# Patient Record
Sex: MALE | Race: WHITE | NOT HISPANIC OR LATINO | Employment: OTHER | URBAN - METROPOLITAN AREA
[De-identification: names, ages, dates, MRNs, and addresses within clinical notes are randomized per-mention and may not be internally consistent; named-entity substitution may affect disease eponyms.]

---

## 2018-01-23 PROBLEM — S46.219A BICEPS TENDON TEAR: Status: ACTIVE | Noted: 2018-01-23

## 2018-05-15 PROBLEM — IMO0002 BURSITIS/TENDONITIS, SHOULDER: Status: ACTIVE | Noted: 2018-05-15

## 2019-05-23 PROBLEM — R57.1 HYPOVOLEMIC SHOCK: Status: ACTIVE | Noted: 2019-05-23

## 2021-03-28 PROBLEM — R41.82 ALTERED MENTAL STATUS: Status: ACTIVE | Noted: 2021-03-28

## 2021-03-30 PROBLEM — R41.82 ALTERED MENTAL STATUS: Status: RESOLVED | Noted: 2021-03-28 | Resolved: 2021-03-30

## 2023-03-23 PROBLEM — G89.4 CHRONIC PAIN DISORDER: Status: ACTIVE | Noted: 2023-03-23

## 2023-03-23 PROBLEM — M62.3 IMMOBILITY SYNDROME: Status: ACTIVE | Noted: 2022-11-22

## 2023-03-23 PROBLEM — I50.22 CHRONIC SYSTOLIC CHF (CONGESTIVE HEART FAILURE): Status: ACTIVE | Noted: 2017-06-24

## 2023-03-27 ENCOUNTER — TELEPHONE (OUTPATIENT)
Dept: INTERNAL MEDICINE | Age: 68
End: 2023-03-27

## 2023-03-27 NOTE — TELEPHONE ENCOUNTER
Caller: William Shah Jr    Relationship: Self    Best call back number: [phone]    What orders are you requesting (i.e. lab or imaging): PATIENT NEEDS A NEW POWERCHAIR    Where will you receive your lab/imaging services: PLEASE SEND TO [facility]  PHONE #[phone]    Additional notes: PLEASE ADVISE WHEN SENT OR WITH FURTHER QUESTIONS

## 2023-03-31 ENCOUNTER — TELEPHONE (OUTPATIENT)
Dept: INTERNAL MEDICINE | Age: 68
End: 2023-03-31

## 2023-03-31 NOTE — TELEPHONE ENCOUNTER
Pt was informed that Lisa was out of the hospital   He would like his Lorazepam to be increase to 4 daily   Trazdone to 200 mg nightly.

## 2023-03-31 NOTE — TELEPHONE ENCOUNTER
Caller: William Shah Jr call back number:[phone]       PATIENT WOULD LIKE A CALL BACK FROM SERGO HAWKINS REGARDING HIS MEDICATIONS.  HE STATED THAT SHE HAS HELPED HIM A LOT.  PLEASE CALL BACK AS SOON AS POSSIBLE.

## 2023-05-01 ENCOUNTER — TELEPHONE (OUTPATIENT)
Dept: INTERNAL MEDICINE | Age: 68
End: 2023-05-01

## 2023-05-01 NOTE — TELEPHONE ENCOUNTER
Caller: William Shah Jr.    Relationship: Self    Best call back number: [phone]    Who are you requesting to speak with (clinical staff, provider,  specific staff member): SERGO HAWKINS     What was the call regarding: WANTS TO SPEAK TO SERGO ABOUT LAB RESULTS     Do you require a callback: YES

## 2023-05-01 NOTE — TELEPHONE ENCOUNTER
"Message left on [facility] on 04/30/23 @ 1601:    \"I have an appointment tomorrow with Dr. Faustin and I mistakenly hit the wrong button, and I still want the appointment at 1:15. So, please keep me on the schedule.\"    Rescheduled appt at same time with Lisa per patient request.  "

## 2023-05-08 ENCOUNTER — TELEPHONE (OUTPATIENT)
Dept: INTERNAL MEDICINE | Age: 68
End: 2023-05-08
Payer: MEDICARE

## 2023-05-08 NOTE — TELEPHONE ENCOUNTER
"Messages left on [facility]ie on 5/5/23 at 2035, and 5/6/23 at 0952, respectively:    \"To reschedule an appointment\"    \"Reschedule\"    Please call patient to get him scheduled.  "

## 2023-05-10 RX ORDER — SERTRALINE HYDROCHLORIDE 100 MG/1
TABLET, FILM COATED ORAL
Qty: 60 TABLET | Refills: 3 | Status: SHIPPED | OUTPATIENT
Start: 2023-05-10

## 2023-05-11 ENCOUNTER — OFFICE VISIT (OUTPATIENT)
Dept: INTERNAL MEDICINE | Age: 68
End: 2023-05-11
Payer: MEDICARE

## 2023-05-11 VITALS
HEIGHT: 60 IN | SYSTOLIC BLOOD PRESSURE: 124 MMHG | HEART RATE: 78 BPM | TEMPERATURE: 98.4 F | BODY MASS INDEX: 39.07 KG/M2 | WEIGHT: 199 LBS | OXYGEN SATURATION: 94 % | DIASTOLIC BLOOD PRESSURE: 66 MMHG

## 2023-05-11 DIAGNOSIS — F10.10 ALCOHOL ABUSE: Primary | ICD-10-CM

## 2023-05-11 DIAGNOSIS — F11.20 NARCOTIC ADDICTION: ICD-10-CM

## 2023-05-11 DIAGNOSIS — F41.9 ANXIETY: ICD-10-CM

## 2023-05-11 DIAGNOSIS — Z99.3 DEPENDENT ON WHEELCHAIR: ICD-10-CM

## 2023-05-11 RX ORDER — GABAPENTIN 400 MG/1
1 CAPSULE ORAL 3 TIMES DAILY
COMMUNITY
Start: 2023-04-28

## 2023-05-11 RX ORDER — LANOLIN ALCOHOL/MO/W.PET/CERES
100 CREAM (GRAM) TOPICAL DAILY
Qty: 30 TABLET | Refills: 2 | COMMUNITY
Start: 2023-04-14 | End: 2023-07-13

## 2023-05-11 RX ORDER — LORAZEPAM 1 MG/1
TABLET ORAL
COMMUNITY
Start: 2023-03-16

## 2023-05-11 RX ORDER — DIAZEPAM 5 MG/1
TABLET ORAL
COMMUNITY
Start: 2023-05-06

## 2023-05-11 RX ORDER — LISINOPRIL 20 MG/1
TABLET ORAL
COMMUNITY
Start: 2023-02-23

## 2023-05-11 NOTE — PROGRESS NOTES
"    I N T E R N A L  M E D I C I N E    Lisa Faustin, APRN       ENCOUNTER DATE:  05/11/2023    William Shah . / 67 y.o. / male      CC:   (Transitional Care Follow Up Visit)  Follow-up (Pt needs MRI results ) and Med Refill (Ativan )        Within 48 business hours after discharge our office contacted him via telephone to coordinate his care and needs.      I reviewed and discussed the details of that call along with the discharge summary, hospital problems, inpatient lab results, inpatient diagnostic studies, and consultation reports with the patient.          View : No data to display.                Risk for Readmission (LACE) No data recorded          VITALS    Vitals:    05/11/23 1302   BP: 124/66   BP Location: Left arm   Patient Position: Sitting   Cuff Size: Large Adult   Pulse: 78   Temp: 98.4 °F (36.9 °C)   TempSrc: Temporal   SpO2: 94%   Weight: 90.3 kg (199 lb)   Height: 152.4 cm (60\")       BP Readings from Last 3 Encounters:   05/11/23 124/66   03/23/23 152/88   03/22/23 (!) 191/108     Wt Readings from Last 3 Encounters:   05/11/23 90.3 kg (199 lb)   03/23/23 90.3 kg (199 lb)   03/21/23 88 kg (194 lb)      Body mass index is 38.86 kg/m².    HPI:      Date of admission/discharge: 5/4/23  Hospital:[facility]   Principle Dx: Alcoholic intoxication with complication  Secondary Dx: Tremors of nervous system  History prior to hospitalization: Patient admitted multiple times to various Hospitals in [place] on  4/10/23, 4/19/23, 4/28/23, 5/4/23 for Alcohol WIthdrawl and Intoxication and refused to go to Alcoholic Rehab or have Psychiatry evaluate per EMR records.    Course:   First visit with this NP on 3/23/23: Patient was given Lorazepam 0.5mg 1 tablet every 8 hrs as needed #90 as a bilateral amputee and anxiety- medication was escribed to his pharmacy of [facility] in [place], [place]. Our office  received a call on 4/30/23 from Mary, patients caregiver, stating that the patient hadn't had Ativan in 4-5 days " and that patient was going through withdrawals, requesting a refill. Our office called [facility] to verify that patient received the whole 90 tablets and was told by the pharmacist that he picked the whole prescription up, 90 tablets, on 3/23/23.     Patient here today requesting refill on his Lorazepam and informed that I will not fill it again due to Narcotic abuse and that he can be referred to Psychiatry for the refill of his Lorazepam. Narcotic consent on file and reviewed written policies with patient.  Patient became belligerent and left the office calling staff obscenities.        Patient Care Team:  Lisa Faustin APRN as PCP - General (Family Medicine)  Self, Conrado Burr MD (Vascular Surgery)  ____________________________________________________________________    ASSESSMENT & PLAN:    1. Alcohol abuse    2. Narcotic addiction    3. Anxiety    4. Dependent on wheelchair      Orders Placed This Encounter   Procedures   • Miscellaneous DME       ____________________________________________________________________    REVIEW OF SYSTEMS        PHYSICAL EXAMINATION    Not performed- patient left the office prior to assessments     REVIEWED DATA:    Labs:   Lab Results   Component Value Date     (L) 03/21/2023    K 4.1 04/30/2023    CALCIUM 9.7 03/21/2023    AST 21 03/21/2023    ALT 18 03/21/2023    BUN 5 (L) 03/21/2023    CREATININE 0.73 (L) 03/21/2023    CREATININE 1.27 03/30/2021    CREATININE 2.74 (H) 03/29/2021    EGFRIFNONA 57 (L) 03/30/2021    EGFRIFAFRI  03/28/2021      Comment:      <15 Indicative of kidney failure.       Lab Results   Component Value Date    WBC 6.16 05/05/2023    HGB 11.9 (L) 05/05/2023    HGB 12.6 (L) 05/04/2023    HGB 12.2 (L) 04/30/2023     05/05/2023       Lab Results   Component Value Date    GLUCOSEU Negative 03/21/2023    BLOODU Negative 03/21/2023    NITRITEU Negative 03/21/2023    LEUKOCYTESUR Negative 03/21/2023       Imaging:   CT Head Without  Contrast    Result Date: 4/27/2023  Narrative: CT Head WO ACCESSION NUMBER: [id number] DATE: 4/27/2023 9:51 PM PROVIDED INDICATION: Trauma: clsoed head injury, intoxicated COMPARISON: 12/26/2012 STUDY QUALITY: Adequate TECHNIQUE: Axial CT images of the head without IV contrast. All CT exams at this location are performed using dose modulation techniques as appropriate to a performed exam including the following: automated exposure control; adjustment of the mA and/or kV according to patient size (this includes techniques or standardized protocols for targeted exams where dose is matched to indication/reason for exam; i.e. extremities or head); use of iterative reconstruction techniques. Patient motion artifact reduces the study sensitivity. Repeat high-resolution imaging provides excellent quality. FINDINGS: Parenchyma: No acute intracranial hemorrhage, mass effect, midline shift, intra or extra-axial fluid collection present. Gray-white differentiation appears maintained. Ventricles: The ventricles and cisterns appear prominent with some white matter changes. Likely related to sequela of small vessel ischemic disease. Soft tissues: Negative Bones: No acute calvarial fracture seen. Visualized paranasal sinuses: Negative Mastoid air cells: Bilateral mastoid effusions of unknown etiology and significance. IMPRESSION: 1. No definite acute hemorrhage or calvarial fracture event identified. 2. Mild atrophy and white matter changes likely related to small vessel ischemic disease. 2. Bilateral mastoid effusions. Dictated by: Mario Maldonado DO Signed by Mario Maldonado DO on 4/27/2023 10:41 PM ##### Final ##### Dictated by:    MARIO MALDONADO DO-RAD Dictated DT/TM: 04/27/2023 10:41 pm Interpreted and electronically signed by:  MARIO MALDONADO DO-RAD Signed DT/TM:  04/27/2023 10:41 pm    CT Abdomen & Pelvis Wo IV Contrast Without Oral Contrast    Result Date: 5/4/2023  Narrative: REVIEWING YOUR TEST RESULTS IN [facility] IS  NOT A SUBSTITUTE FOR DISCUSSING THOSE RESULTS WITH YOUR HEALTH CARE PROVIDER. PLEASE CONTACT YOUR PROVIDER VIA [employer] TO DISCUSS ANY QUESTIONS OR CONCERNS YOU MAY HAVE REGARDING THESE TEST RESULTS.  RADIOLOGY REPORT FACILITY:  [facility] UNIT/AGE/GENDER: RACH  ER      AGE:67 Y          SEX:M PATIENT NAME/:  MATEUSZ GARZA      1955 UNIT NUMBER:  [id number] ACCOUNT NUMBER:  [id number] ACCESSION NUMBER:  [id number] EXAMINATION: CT ABDOMEN AND PELVIS WO IV CONTRAST DATE: 2023 COMPARISON: April 10, 2023 HISTORY: Abdominal pain, acute, nonlocalized TECHNIQUE:  Multiple axial 3 mm images obtained of the abdomen and pelvis without contrast. Imaging was performed from the lung bases to the pubic symphysis. CT scans at this facility use dose modulation, iterative reconstruction and/or weight based dosing when appropriate to reduce radiation dose to as low as reasonably achievable. FINDINGS: There is mild bibasilar atelectasis. No free intraperitoneal air. Streak artifact limits evaluation. The examination for solid organs and the bowel are limited without intravenous and oral contrast respectively. There is mild anasarca. Cardiomegaly. Postoperative changes at the rectosigmoid. Large bowel loops are within normal limits in course and caliber. Mild wall thickening of the stomach nonspecific likely due to underdistention. Small bowel loops are within normal limits in course and caliber. There is no bowel obstruction. Appendix unremarkable. Mild hepatic steatosis. Mild lobular appearance of the spleen is similar with calcifications. Spleen is mildly enlarged measuring 15 cm in length but similar. Unenhanced adrenal glands and pancreas appear within normal limits. Mild distention of the gallbladder. No gallbladder wall thickening. No renal stones. Small hyperdensity at the lower pole of the right left kidney anteriorly may represent hyperdense cyst or artifact. Bladder is mildly  distended. No bladder calcifications. The femoral-femoral bypass graft is noted. There is advanced of atherosclerosis abdominal aorta and branch vessels. Scattered nonenlarged mesenteric and retroperitoneal lymph nodes. There is no acute bony abnormality. Moderate spondylosis of the visualized spine. Similar mild small ventral fat-containing hernias. IMPRESSION: 1. No acute abnormality in the abdomen or pelvis. 2. Advanced atherosclerosis abdominal aorta and branch vessels. Fem-fem bypass graft again noted. 3. Again seen is mild hepatic steatosis. Similar splenomegaly. Dictated by: Elvia De Souza M.D. Images and Report reviewed and interpreted by: Elvia De Souza M.D. <PS><Electronically signed by: Elvia De Souza M.D.> 2023 1507 D: 2023 1501 T: 2023 1501    CT Head Wo Contrast    Result Date: 2023  Narrative: REVIEWING YOUR TEST RESULTS IN [facility] IS NOT A SUBSTITUTE FOR DISCUSSING THOSE RESULTS WITH YOUR HEALTH CARE PROVIDER. PLEASE CONTACT YOUR PROVIDER VIA [facility] TO DISCUSS ANY QUESTIONS OR CONCERNS YOU MAY HAVE REGARDING THESE TEST RESULTS.  RADIOLOGY REPORT FACILITY:  [facility] UNIT/AGE/GENDER: RACH  ER      AGE:67 Y          SEX:M PATIENT NAME/:  MATEUSZ GARZA    1955 UNIT NUMBER:  [id number] ACCOUNT NUMBER:  [id number] ACCESSION NUMBER:  [id number] EXAMINATION: CT HEAD WO CONTRAST DATE: 2023 HISTORY: Neuro deficit, acute, stroke suspected. Intoxicated. TECHNIQUE: CT of the head was performed using axial slices from the base of the skull to the vertex. CT examinations at this facility use dose modulation, iterative reconstruction and/or weight based dosing when appropriate to reduce radiation dose to as  low as reasonably achievable. COMPARISON: 2023 FINDINGS: There is no hemorrhage, mass lesion, or CT evidence of ischemia. There is no mass-effect or midline shift. The grey-white differentiation is normal. Calcification is noted  within the basal ganglia bilaterally. No intra- or extra-axial fluid collections are seen. The lateral ventricles are symmetric and normal in caliber. The basal cisterns are patent. There is mild atherosclerotic calcification. There are bilateral mastoid effusions. The paranasal sinuses and orbits are normal. No fractures are identified.     IMPRESSION: 1. No acute intracranial abnormality. No hemorrhage, mass, or CT evidence of ischemia. 2. Bilateral mastoid effusions. Dictated by: Ray Griffith M.D. Images and Report reviewed and interpreted by: Ray Griffith M.D. <PS><Electronically signed by: Ray Griffith M.D.> 2023 1441 D: 2023 1439 T: 2023 1439    MRI Brain Wo & W Con    Result Date: 2023  Narrative: REVIEWING YOUR TEST RESULTS IN [facility] IS NOT A SUBSTITUTE FOR DISCUSSING THOSE RESULTS WITH YOUR HEALTH CARE PROVIDER. PLEASE CONTACT YOUR PROVIDER VIA [facility] TO DISCUSS ANY QUESTIONS OR CONCERNS YOU MAY HAVE REGARDING THESE TEST RESULTS.  RADIOLOGY REPORT FACILITY:  [facility] UNIT/AGE/GENDER: [id number]  IN      AGE:67 Y          SEX:M PATIENT NAME/:  MATEUSZ GARZAJR.    1955 UNIT NUMBER:  [id number] ACCOUNT NUMBER:  [id number] ACCESSION NUMBER:  [id number] MRI BRAIN WO AND W CONTRAST DATE: 2023 INDICATION: Neuro deficit, acute, stroke suspected. Alcohol withdrawal. Speech difficulty. COMPARISON: CT and CTA one day prior. TECHNIQUE: Standard multisequential multiplanar magnetic resonance imaging study of the brain was performed prior to and following the administration of 17.2 mL intravenous gadoterate meglumine.   FINDINGS: There is no evidence of diffusion restriction to suggest either acute or early subacute cerebral infarction.  There is no evidence of a mass or hemorrhage. T2-weighted imaging demonstrates a mild degree of T2 prolongation most prominently in the periventricular white matter the occipital horn of the left lateral  ventricle..  The study is limited due to excessive patient motion. There is no evidence of abnormal T2 signal at the level of the periaqueductal gray matter or central edinson and there is no significant increased FLAIR signal associated with the mammillary bodies. There is no midline shift or mass-effect. There is no evidence of obstructive hydrocephalus. The sulci and ventricles appear normal for age. The distal vertebral, basilar and internal carotid arteries are patent. The corpus callosum, suprasellar cistern, pituitary and foramen magnum regions appear normal. The paranasal sinuses demonstrate no evidence of acute sinusitis. Note is made of prominent bilateral mastoid air cell fluid with right middle ear fluid. On motion limited postcontrast imaging there is no definitive abnormal enhancement. IMPRESSION: 1.  There is no evidence of acute infarction, mass lesion or hemorrhage. 2. Motion limited study. 3. Prominent bilateral mastoid effusions. 4. There are no imaging manifestations of Wernicke's encephalopathy. Dictated by: Lewis Santiago M.D. Images and Report reviewed and interpreted by: Lewis Santiago M.D. <PS><Electronically signed by: Lewis Santiago M.D.> 2023 D: 2023 T: 2023    US ABDOMEN RUQ, includes liver    Result Date: 2023  Narrative: REVIEWING YOUR TEST RESULTS IN [facility] IS NOT A SUBSTITUTE FOR DISCUSSING THOSE RESULTS WITH YOUR HEALTH CARE PROVIDER. PLEASE CONTACT YOUR PROVIDER VIA [facility] TO DISCUSS ANY QUESTIONS OR CONCERNS YOU MAY HAVE REGARDING THESE TEST RESULTS.  RADIOLOGY REPORT FACILITY:  [facility] UNIT/AGE/GENDER: [id number]  IN      AGE:67 Y          SEX:M PATIENT NAME/:  GREGMATEUSZ    1955 UNIT NUMBER:  [id number] ACCOUNT NUMBER:  [id number] ACCESSION NUMBER:  [id number] EXAMINATION: US ABDOMEN RUQ DATE: 2023 HISTORY: EtOH abuse. check for cirrhosis     COMPARISON: CT of the abdomen and pelvis dated  04/10/2023 FINDINGS: Increased echogenicity of the liver. The gallbladder is normal. There are no gallstones, wall thickening, or pericholecystic fluid. The sonographic Silva's sign was reportedly negative. The common bile duct is normal measuring 4 mm in diameter. There are no abnormalities of the imaged portions of the pancreas. The right kidney measures 11.5 x 4.1 x 5.4 cm and is normal. IMPRESSION: 1.Diffuse hepatic steatosis.     Dictated by: Afshin Miranda M.D. Images and Report reviewed and interpreted by: Afshin Miranda M.D. <PS><Electronically signed by: Afshin Miranda M.D.> 2023 D: 2023 T: 2023    CT Angiogram Neck    Result Date: 2023  Narrative: REVIEWING YOUR TEST RESULTS IN [facility] IS NOT A SUBSTITUTE FOR DISCUSSING THOSE RESULTS WITH YOUR HEALTH CARE PROVIDER. PLEASE CONTACT YOUR PROVIDER VIA [facility] TO DISCUSS ANY QUESTIONS OR CONCERNS YOU MAY HAVE REGARDING THESE TEST RESULTS.  RADIOLOGY REPORT FACILITY:  [facility] UNIT/AGE/GENDER: RACH  ER      AGE:67 Y          SEX:M PATIENT NAME/:  MATEUSZ GARZA    1955 UNIT NUMBER:  [id number] ACCOUNT NUMBER:  [id number] ACCESSION NUMBER:  [id number] CTA of the neck and brain HISTORY:  Dysarthria COMPARISON:  None TECHNIQUE: Multiple 5 mm axial images are obtained through the brain without contrast. Multiple helical images were obtained from skull vertex to the skullbase during intravenous administration of 100 cc of Isovue-370 utilizing CTA of the neck and brain protocol. Multiple coronal and sagittal reformatted images are obtained. Multiple volume rendered and surface rendered images are obtained through the cervical and cerebral vasculature. Per CMS specifications, dose optimization techniques including at least one of the following were performed, as appropriate:  Automated exposure control, adjustment of the mA and/or kV according to the patient's size,  use of iterative reconstruction techniques. FINDINGS:      CTA of the neck: Aortic Arch: Normal Subclavian arteries: Normal Lung apices: Apices are clear RIGHT anterior circulation: The common carotid origin is widely patent. The common carotid and internal carotid artery are normal. There is no significant hemodynamic stenosis. LEFT anterior circulation: The LEFT common carotid origin is patent. The common carotid and internal carotid arteries are normal. There is no hemodynamic stenosis. Posterior circulation: The RIGHT vertebral artery is widely patent with no significant stenosis or evidence of dissection. The LEFT vertebral artery is widely patent with no evidence of stenosis or dissection. No laryngeal or pharyngeal mass lesions are identified. The thyroid gland, submandibular glands and parotid glands are unremarkable.  Scattered subcentimeter lymph nodes are identified, but they do not meet pathologic size criteria.    CTA of the head Right anterior circulation:  There is no significant stenosis or aneurysm.       Left anterior circulation:   There is no significant stenosis or aneurysm.       Posterior circulation:  The left vertebral artery is dominant. The right vertebral artery is patent past the dural reflection. There is focal severe stenosis of the distal V4 segment. It is unclear whether this is acute or chronic. The basilar is normal.  Normal P1 segments and distal posterior cerebral circulation..       Impression: Focal severe stenosis of the distal right V4 segment. This is of unclear clinical significance as the left vertebral artery is dominant. It is unclear whether these changes are acute or chronic. The the posterior inferior right cerebellar artery appears patent.   Dictated by: Isidro Westfall M.D. Images and Report reviewed and interpreted by: Isidro Westfall M.D. <PS><Electronically signed by: Isidro Westfall M.D.> 04/28/2023 2244 D: 04/28/2023 2233 T: 04/28/2023 2233    CT Angiogram  Head    Result Date: 2023  Narrative: REVIEWING YOUR TEST RESULTS IN [facility] IS NOT A SUBSTITUTE FOR DISCUSSING THOSE RESULTS WITH YOUR HEALTH CARE PROVIDER. PLEASE CONTACT YOUR PROVIDER VIA [facility] TO DISCUSS ANY QUESTIONS OR CONCERNS YOU MAY HAVE REGARDING THESE TEST RESULTS.  RADIOLOGY REPORT FACILITY:  [facility] UNIT/AGE/GENDER: RACH  ER      AGE:67 Y          SEX:M PATIENT NAME/:  MATEUSZ GARZAJR.    1955 UNIT NUMBER:  [id number] ACCOUNT NUMBER:  [id number] ACCESSION NUMBER:  [id number] CTA of the neck and brain HISTORY:  Dysarthria COMPARISON:  None TECHNIQUE: Multiple 5 mm axial images are obtained through the brain without contrast. Multiple helical images were obtained from skull vertex to the skullbase during intravenous administration of 100 cc of Isovue-370 utilizing CTA of the neck and brain protocol. Multiple coronal and sagittal reformatted images are obtained. Multiple volume rendered and surface rendered images are obtained through the cervical and cerebral vasculature. Per CMS specifications, dose optimization techniques including at least one of the following were performed, as appropriate:  Automated exposure control, adjustment of the mA and/or kV according to the patient's size, use of iterative reconstruction techniques. FINDINGS:      CTA of the neck: Aortic Arch: Normal Subclavian arteries: Normal Lung apices: Apices are clear RIGHT anterior circulation: The common carotid origin is widely patent. The common carotid and internal carotid artery are normal. There is no significant hemodynamic stenosis. LEFT anterior circulation: The LEFT common carotid origin is patent. The common carotid and internal carotid arteries are normal. There is no hemodynamic stenosis. Posterior circulation: The RIGHT vertebral artery is widely patent with no significant stenosis or evidence of dissection. The LEFT vertebral artery is widely patent  with no evidence of stenosis or dissection. No laryngeal or pharyngeal mass lesions are identified. The thyroid gland, submandibular glands and parotid glands are unremarkable.  Scattered subcentimeter lymph nodes are identified, but they do not meet pathologic size criteria.    CTA of the head Right anterior circulation:  There is no significant stenosis or aneurysm.       Left anterior circulation:   There is no significant stenosis or aneurysm.       Posterior circulation:  The left vertebral artery is dominant. The right vertebral artery is patent past the dural reflection. There is focal severe stenosis of the distal V4 segment. It is unclear whether this is acute or chronic. The basilar is normal.  Normal P1 segments and distal posterior cerebral circulation..       Impression: Focal severe stenosis of the distal right V4 segment. This is of unclear clinical significance as the left vertebral artery is dominant. It is unclear whether these changes are acute or chronic. The the posterior inferior right cerebellar artery appears patent.   Dictated by: Isidro Westfall M.D. Images and Report reviewed and interpreted by: Isidro Westfall M.D. <PS><Electronically signed by: Isidro Westfall M.D.> 20234 D: 2023 T: 2023    CT Head Wo Contrast    Result Date: 2023  Narrative: REVIEWING YOUR TEST RESULTS IN [facility] IS NOT A SUBSTITUTE FOR DISCUSSING THOSE RESULTS WITH YOUR HEALTH CARE PROVIDER. PLEASE CONTA[facility] YOUR PROVIDER [facility]IA [facility] TO DISCUSS ANY QUESTIONS OR CONCERNS YOU MAY HAVE REGARDING THESE TEST RESULTS.  RADIOLOGY REPORT [facility]ACILITY:  [facility] UNIT/AGE/GENDER: RACH  ER      AGE:67 Y          SEX:M PATIENT NAME/:  MATEUSZ GARZA    1955 UNIT NUMBER:  [id number] ACCOUNT NUMBER:  [id number] ACCESSION NUMBER:  [id number] EXAMINATION: CT of the head without contrast. HISTORY:  AMS, dysarthria.       COMPARISON: None. TECHNIQUE: Multiple  axial images of the brain were obtained without contrast.     Per CMS specifications, dose optimization techniques including at least one of the following were performed, as appropriate:  Automated exposure control, adjustment of the mA and/or kV according to the patient's size, use of iterative reconstruction techniques. FINDINGS: There is no intraparenchymal hemorrhage or extra-axial fluid collection.       The third and lateral ventricles are symmetric and normal in size. The fourth ventricle is normal and in the midline.     The visualized bones of the face, skull base, and calvarium are intact.       The visualized paranasal sinuses are clear. IMPRESSION: No CT evidence of acute intracranial abnormality.     End Impression Dictated by: Isidro Westfall M.D. Images and Report reviewed and interpreted by: Isidro Westfall M.D. <PS><Electronically signed by: Isidro Westfall M.D.> 04/28/2023 2231 D: 04/28/2023 2230 T: 04/28/2023 2230    CT cervical spine wo IV contrast    Result Date: 4/27/2023  Narrative: PROCEDURE INFORMATION: Exam: CT Cervical Spine Without Contrast Exam date and time: 4/27/2023 9:50 PM Age: 67 years old Clinical indication: Trauma: Clsoed head injury, intoxicated TECHNIQUE: Imaging protocol: Computed tomography of the cervical spine without contrast. Radiation optimization: All CT scans at this facility use at least one of these dose optimization techniques: automated exposure control; mA and/or kV adjustment per patient size (includes targeted exams where dose is matched to clinical indication); or iterative reconstruction. REPORTING DATA: Count of CT and Cardiac NM exams in prior 12 months: This patient has received 0 known CTs and 0 known cardiac nuclear medicine studies in the 12 months prior to the current study. COMPARISON: No relevant prior studies available. FINDINGS: Limitations: Patient motion. Bones/joints: Non-specific straightening. Vertebral body height and AP alignment is preserved.  Previous fusion involving C6-C7. Mild-to-moderate degenerative change about the dens. Mild to moderate prevertebral osteophytosis. Bilateral facet joint degenerative change. No definite acute cervical spine fracture. No definite high-grade central canal stenosis within limitations of technique. Multilevel cervical foraminal stenoses. Lungs: Lung apices are normal. Pleural spaces: No visible pneumothorax. Vasculature: Vascular calcification. Soft tissues: Unremarkable. IMPRESSION: No acute cervical spine fracture. ##### Final ##### Dictated by:    TRICE BLEDSOE MD-RAD Dictated DT/TM: 2023 0:22 am Interpreted and electronically signed by:  TRICE BLEDSOE MD-RAD Signed DT/TM:  2023 0:22 am    XR Chest 1 Vw    Result Date: 2023  Narrative: RE[facility]ING YOUR TEST RESULTS IN [facility] IS NOT A SUBSTITUTE FOR DISCUSSING THOSE RESULTS WITH YOUR HEALTH CARE PROVIDER. PLEASE [facility] YOUR PROVID[facility] TO DISCUSS ANY QUESTIONS OR CONCERNS YOU MAY HAVE REGARDING THESE TEST RESULTS.  RADIOLOGY REPORT FACILITY:  [facility] UNIT/AGE/GENDER: [place]  IN      AGE:67 Y          SEX:M PATIENT NAME/:  MATEUSZ GARZAJR.    1955 UNIT NUMBER:  [id number] ACCOUNT NUMBER:  [id number] ACCESSION NUMBER:  [id number] EXAM: XR CHEST 1 VW 2023 3:26 PM HISTORY: Shortness of air COMPARISON: 2023 TECHNIQUE:  Single portable AP view of the chest FINDINGS: Mild cardiomegaly. Coronary artery stent in place. Slight elevation of the right hemidiaphragm. The lungs are clear. No pleural effusion or pneumothorax. IMPRESSION: No acute radiographic abnormality of the chest. Dictated by: Justin Cifuentes M.D. Images and Report reviewed and interpreted by: Justin Cifuentes M.D. <PS><Electronically signed by: Justin Cifuentes M.D.> 2023 1544 D: 2023 1543 T: 2023 1543       Medical Tests:        Summary of old records / correspondence / consultant report:   DC summary re: issues  addressed on HPI    Request outside records:         MEDICATIONS   Current Outpatient Medications   Medication Sig Dispense Refill   • Acetaminophen Extra Strength 500 MG tablet TAKE ONE TABLET BY MOUTH EVERY 6 HOURS AS NEEDED 120 tablet 3   • amLODIPine (NORVASC) 5 MG tablet Take 1 tablet by mouth Daily. 90 tablet 3   • atorvastatin (LIPITOR) 40 MG tablet Take 1 tablet by mouth Daily. 90 tablet 0   • Calcium Carbonate 500 MG chewable tablet Chew 1,000 mg Every 6 (Six) Hours As Needed (indigestion). 90 each 0   • cloNIDine (CATAPRES) 0.1 MG tablet Take 1 tablet by mouth 2 (Two) Times a Day. 60 tablet 2   • diazePAM (VALIUM) 5 MG tablet TAKE TWO TABLETS BY MOUTH EVERY 8 HOURS FOR 2 DAYS, THEN ONE TABLET EVERY 8 HOURS FOR 2 DAYS, THEN ONE TABLET EVERY TWELVE HOURS FOR 2 DAYS, THEN ONE TABLET DAILY FOR 2 DAYS. MAX DAILY DOSE IS SIX TABLETS     • diphenhydrAMINE-zinc acetate (BENADRYL) 1-0.1 % cream Apply 1 application topically to the appropriate area as directed Every 12 (Twelve) Hours As Needed for Itching. Apply to L shoulder     • docusate sodium (COLACE) 250 MG capsule Take 1 capsule by mouth Daily.     • esomeprazole (nexIUM) 20 MG capsule Take 1 capsule by mouth Every Morning Before Breakfast. 30 capsule 5   • folic acid (FOLVITE) 1 MG tablet Take 1 tablet by mouth Daily. 30 tablet 2   • gabapentin (NEURONTIN) 100 MG capsule Take 1 capsule by mouth 3 (Three) Times a Day.     • gabapentin (NEURONTIN) 400 MG capsule Take 1 capsule by mouth 3 (Three) Times a Day.     • hydrocortisone 1 % cream Apply 1 application topically to the appropriate area as directed 2 (Two) Times a Day As Needed for Rash (itching/rash).     • Lactobacillus (Floranex) pack oral packet Take 1 packet by mouth Daily. 12 packet 5   • lisinopril (PRINIVIL,ZESTRIL) 20 MG tablet      • Lofexidine HCl (Lucemyra) 0.18 MG tablet See Admin Instructions.     • LORazepam (ATIVAN) 1 MG tablet      • metoprolol succinate XL (TOPROL-XL) 50 MG 24 hr tablet  Take 1 tablet by mouth Daily. 90 tablet 0   • ondansetron (ZOFRAN) 4 MG tablet TAKE ONE TABLET BY MOUTH EVERY 8 HOURS AS NEEDED 90 tablet 3   • polyethylene glycol (MIRALAX) 17 GM/SCOOP powder Take 17 g by mouth.     • sertraline (ZOLOFT) 100 MG tablet TAKE TWO TABLETS BY MOUTH DAILY 60 tablet 3   • thiamine (VITAMIN B1) 100 MG tablet Take 1 tablet by mouth Daily. 30 tablet 2   • tiZANidine (ZANAFLEX) 4 MG tablet TAKE ONE TABLET BY MOUTH NIGHTLY AT BEDTIME 30 tablet 3   • traZODone (DESYREL) 100 MG tablet Take 1 tablet by mouth Every Night. 30 tablet 4   • venlafaxine XR (EFFEXOR-XR) 150 MG 24 hr capsule Take 1 capsule by mouth Daily. 30 capsule 4   • Xarelto 20 MG tablet TAKE ONE TABLET BY MOUTH EVERY EVENING 30 tablet 0     No current facility-administered medications for this visit.       Current outpatient and discharge medications have been reconciled for the patient.  Reviewed by: ANJANA Strauss         Examiner was wearing KN95 mask and exam gloves during the entire duration of the visit. Patient was masked the entire time.   Minimum social distance of 6 ft maintained entire visit except if physical contact was necessary as documented.

## 2023-05-18 ENCOUNTER — TELEPHONE (OUTPATIENT)
Dept: INTERNAL MEDICINE | Age: 68
End: 2023-05-18
Payer: MEDICARE

## 2023-05-18 NOTE — TELEPHONE ENCOUNTER
"Patient called and was inquiring about an order for a wheelchair being sent to [facility].   I reviewed his chart and didn't see an order for this and let him know that I would send a message to Lisa Faustin and patient replied with \"I don't need Ms. Faustin for anything, I have a new PCP.\"  I asked the patient if the new PCP was taking care of the order for the wheelchair and he said that he would call them. He proceeds to tell me that I was useless and hung up on me.  "

## 2023-06-08 RX ORDER — FOLIC ACID 1 MG/1
TABLET ORAL
Qty: 90 TABLET | Refills: 0 | Status: SHIPPED | OUTPATIENT
Start: 2023-06-08

## 2023-06-08 RX ORDER — CLONIDINE HYDROCHLORIDE 0.1 MG/1
TABLET ORAL
Qty: 120 TABLET | Refills: 1 | Status: SHIPPED | OUTPATIENT
Start: 2023-06-08

## 2023-06-14 RX ORDER — METOPROLOL SUCCINATE 50 MG/1
50 TABLET, EXTENDED RELEASE ORAL DAILY
Qty: 90 TABLET | Refills: 0 | OUTPATIENT
Start: 2023-06-14

## 2023-06-14 RX ORDER — ATORVASTATIN CALCIUM 40 MG/1
40 TABLET, FILM COATED ORAL DAILY
Qty: 90 TABLET | Refills: 0 | OUTPATIENT
Start: 2023-06-14

## 2023-08-11 RX ORDER — TRAZODONE HYDROCHLORIDE 100 MG/1
TABLET ORAL
Qty: 120 TABLET | Refills: 0 | OUTPATIENT
Start: 2023-08-11

## 2023-08-11 RX ORDER — SERTRALINE HYDROCHLORIDE 100 MG/1
TABLET, FILM COATED ORAL
Qty: 60 TABLET | Refills: 3 | OUTPATIENT
Start: 2023-08-11

## 2023-08-11 RX ORDER — VENLAFAXINE HYDROCHLORIDE 150 MG/1
CAPSULE, EXTENDED RELEASE ORAL
Qty: 120 CAPSULE | Refills: 0 | OUTPATIENT
Start: 2023-08-11

## 2023-08-11 RX ORDER — TIZANIDINE 4 MG/1
TABLET ORAL
Qty: 30 TABLET | Refills: 0 | OUTPATIENT
Start: 2023-08-11

## 2023-08-11 RX ORDER — RIVAROXABAN 20 MG/1
TABLET, FILM COATED ORAL
Qty: 90 TABLET | Refills: 0 | OUTPATIENT
Start: 2023-08-11

## 2023-08-11 RX ORDER — ONDANSETRON 4 MG/1
TABLET, FILM COATED ORAL
Qty: 90 TABLET | Refills: 0 | OUTPATIENT
Start: 2023-08-11

## 2023-08-21 ENCOUNTER — OFFICE VISIT (OUTPATIENT)
Dept: ORTHOPEDIC SURGERY | Facility: CLINIC | Age: 68
End: 2023-08-21
Payer: MEDICARE

## 2023-08-21 VITALS — HEIGHT: 60 IN | WEIGHT: 199 LBS | TEMPERATURE: 97.8 F | BODY MASS INDEX: 39.07 KG/M2

## 2023-08-21 DIAGNOSIS — M25.512 ACUTE PAIN OF LEFT SHOULDER: ICD-10-CM

## 2023-08-21 DIAGNOSIS — R52 PAIN: Primary | ICD-10-CM

## 2023-08-21 RX ORDER — HYDROCODONE BITARTRATE AND ACETAMINOPHEN 5; 325 MG/1; MG/1
1 TABLET ORAL EVERY 6 HOURS PRN
Qty: 20 TABLET | Refills: 0 | Status: SHIPPED | OUTPATIENT
Start: 2023-08-21

## 2023-08-21 NOTE — PROGRESS NOTES
New Shoulder      Patient: William Shah Jr.        YOB: 1955    Medical Record Number: [id number]        Chief Complaints: Left posterior shoulder pain      History of Present Illness: This is a 68-year-old patient who I have seen before who presents complaining of left shoulder pain he is status post left shoulder arthroscopy in 2018 he was doing well until about 3 weeks ago he started doing a little more strengthening and all of a sudden had severe pain in the medial aspect of his left shoulder pain.  He states it is miserable he cannot sleep he has been sleeping in his chair because is the only place he can get comfortable even for short period of time.  He states he cannot do anti-inflammatories he cannot do steroids he does appear very miserable      Allergies:   Allergies   Allergen Reactions    Morphine And Related Mental Status Change    Penicillins Hives    Adhesive Tape Dermatitis     Patient had contact dermatitis to adhesive material of telemetry electrodes    Latex Unknown (See Comments)     Unknown        Medications:   Home Medications:  No current facility-administered medications on file prior to visit.     Current Outpatient Medications on File Prior to Visit   Medication Sig    Acetaminophen Extra Strength 500 MG tablet TAKE ONE TABLET BY MOUTH EVERY 6 HOURS AS NEEDED    amLODIPine (NORVASC) 5 MG tablet Take 1 tablet by mouth Daily.    atorvastatin (LIPITOR) 40 MG tablet Take 1 tablet by mouth Daily.    cloNIDine (CATAPRES) 0.1 MG tablet TAKE ONE TABLET BY MOUTH TWICE DAILY    diphenhydrAMINE-zinc acetate (BENADRYL) 1-0.1 % cream Apply 1 application  topically to the appropriate area as directed Every 12 (Twelve) Hours As Needed for Itching. Apply to L shoulder    docusate sodium (COLACE) 250 MG capsule Take 1 capsule by mouth Daily.    esomeprazole (nexIUM) 20 MG capsule Take 1 capsule by mouth Every Morning Before Breakfast.    folic acid (FOLVITE) 1 MG tablet TAKE ONE  TABLET BY MOUTH DAILY    hydrocortisone 1 % cream Apply 1 application  topically to the appropriate area as directed 2 (Two) Times a Day As Needed for Rash (itching/rash).    Lactobacillus (Floranex) pack oral packet Take 1 packet by mouth Daily.    lisinopril (PRINIVIL,ZESTRIL) 20 MG tablet     Lofexidine HCl (Lucemyra) 0.18 MG tablet See Admin Instructions.    metoprolol succinate XL (TOPROL-XL) 50 MG 24 hr tablet Take 1 tablet by mouth Daily.    polyethylene glycol (MIRALAX) 17 GM/SCOOP powder Take 17 g by mouth.    sertraline (ZOLOFT) 100 MG tablet TAKE TWO TABLETS BY MOUTH DAILY    traZODone (DESYREL) 100 MG tablet Take 1 tablet by mouth Every Night.    Xarelto 20 MG tablet TAKE ONE TABLET BY MOUTH EVERY EVENING    Calcium Carbonate 500 MG chewable tablet Chew 1,000 mg Every 6 (Six) Hours As Needed (indigestion). (Patient not taking: Reported on 8/21/2023)    diazePAM (VALIUM) 5 MG tablet TAKE TWO TABLETS BY MOUTH EVERY 8 HOURS FOR 2 DAYS, THEN ONE TABLET EVERY 8 HOURS FOR 2 DAYS, THEN ONE TABLET EVERY TWELVE HOURS FOR 2 DAYS, THEN ONE TABLET DAILY FOR 2 DAYS. MAX DAILY DOSE IS SIX TABLETS (Patient not taking: Reported on 8/21/2023)    gabapentin (NEURONTIN) 100 MG capsule Take 1 capsule by mouth 3 (Three) Times a Day. (Patient not taking: Reported on 8/21/2023)    gabapentin (NEURONTIN) 400 MG capsule Take 1 capsule by mouth 3 (Three) Times a Day. (Patient not taking: Reported on 8/21/2023)    LORazepam (ATIVAN) 1 MG tablet  (Patient not taking: Reported on 8/21/2023)    ondansetron (ZOFRAN) 4 MG tablet TAKE ONE TABLET BY MOUTH EVERY 8 HOURS AS NEEDED (Patient not taking: Reported on 8/21/2023)    venlafaxine XR (EFFEXOR-XR) 150 MG 24 hr capsule Take 1 capsule by mouth Daily. (Patient not taking: Reported on 8/21/2023)    [DISCONTINUED] tiZANidine (ZANAFLEX) 4 MG tablet TAKE ONE TABLET BY MOUTH NIGHTLY AT BEDTIME (Patient not taking: Reported on 8/21/2023)     Current Medications:  Scheduled Meds:  Continuous  Infusions:No current facility-administered medications for this visit.    PRN Meds:.    Past Medical History:   Diagnosis Date    Amputated below knee     left    Angina pectoris     Anxiety     Atherosclerotic heart disease     Bowel perforation     Chronic pain syndrome     Congenital absence of lower limb     Constipation     Coordination impairment     Esophagitis     Factor 5 Leiden mutation, heterozygous     Gait abnormality     GERD (gastroesophageal reflux disease)     Hyperlipidemia     Hypertension     Insomnia     Lupus     Lupus (systemic lupus erythematosus)     Major depressive disorder     MI (myocardial infarction)     Mobility impaired     Muscle spasm     Neuropathy     On continuous oral anticoagulation     xarelto    Open wound     2 times day to left stump area per nursing staff at [facility] wound cleanse solution then uses e-stim then applies W-D saline drsg and covers with kerlex then coban    Pancreatitis     Peripheral vascular disease     Polyneuropathy     Shoulder pain, left     Staph infection     left thigh history    Traumatic amputation of leg above knee     right        Past Surgical History:   Procedure Laterality Date    APPENDECTOMY      BACK SURGERY      neck fusion     CARPAL TUNNEL RELEASE      COLON RESECTION      CORONARY ANGIOPLASTY WITH STENT PLACEMENT      FASCIOTOMY      left leg    HERNIA REPAIR      HERNIA REPAIR      4 of them bilateral inguinal and a double     LEG AMPUTATION      above  knee right, below knee left     NERVE BLOCK      neck for RSD    ORIF WRIST FRACTURE Right     ROTATOR CUFF REPAIR Right     SHOULDER ARTHROSCOPY Left 1/29/2018    Procedure: LEFT SHOULDER ARTHROSCOPY WITH SUBACROMIAL DECOMPRESSION, DEBRIDEMENT OF ROTATOR CUFF, DEBRIDEMENT OF LABRUM, STERIOD INJECTION  AND BICEPS TENOTOMY;  Surgeon: Hawa Ellsworth MD;  Location: [facility] OR [facility] [place];  Service:     SKIN GRAFT      TONSILLECTOMY          Social History     Occupational  "History    Not on file   Tobacco Use    Smoking status: Former     Packs/day: 1.00     Years: 10.00     Pack years: 10.00     Types: Cigarettes     Quit date:      Years since quittin.6    Smokeless tobacco: Never   Vaping Use    Vaping Use: Never used   Substance and Sexual Activity    Alcohol use: No    Drug use: No    Sexual activity: Not on file      Social History     Social History Narrative    Not on file        Family History   Problem Relation Age of Onset    Malig Hyperthermia Neg Hx              Review of Systems:     Review of Systems      Physical Exam: 68 y.o. male  General Appearance:    Alert, cooperative, in no acute distress                   Vitals:    23 1403   Temp: 97.8 øF (36.6 øC)   Weight: 90.3 kg (199 lb)   Height: 152.4 cm (60\")   PainSc:   9      Patient is alert and read x3 no acute distress appears her above-listed at height weight and age.  Affect is normal respiratory rate is normal unlabored. Heart rate regular rate rhythm, sclera, dentition and hearing are normal for the purpose of this exam.    Ortho Exam  Exam of the shoulder he has good range of motion with the shoulder itself but he has severe pain about the medial aspect of the scapula he has marked palpable tenderness in this area as well he has no overlying skin changes  Procedures          Radiology:   AP, Scapular Y and Axillary Lateral of the left shoulder were ordered/reviewed to evauate shoulder pain.  I have compared x-rays in 2017 he does have marked sclerosis at the insertion of the cuff and acromioclavicular arthritis these have progressed since last x-rays no acute pathology  Imaging Results (Most Recent)       Procedure Component Value Units Date/Time    XR Shoulder 2+ View Left [[id number]] Resulted: 23 1352     Updated: 23 1356    Impression:      Ordering physician's impression is located in the Encounter Note dated 23. X-ray performed in the DR room.            Assessment/Plan: " Severe left posterior shoulder pain this seems nerve due to the severity of his symptoms.  Steroid Dosepak is what I would really like to do but he states that just wires him to know when and he cannot tolerate them.  I want him to ice these I am going to give him a few hydrocodone to see if we can get this calm down I told him if his symptoms are still the same tomorrow I want him to go to the ER

## 2023-08-22 ENCOUNTER — TELEPHONE (OUTPATIENT)
Dept: ORTHOPEDIC SURGERY | Facility: CLINIC | Age: 68
End: 2023-08-22

## 2023-08-22 ENCOUNTER — APPOINTMENT (OUTPATIENT)
Dept: GENERAL RADIOLOGY | Facility: HOSPITAL | Age: 68
End: 2023-08-22
Payer: MEDICARE

## 2023-08-22 ENCOUNTER — HOSPITAL ENCOUNTER (EMERGENCY)
Facility: HOSPITAL | Age: 68
Discharge: HOME | End: 2023-08-22
Attending: EMERGENCY MEDICINE
Payer: MEDICARE

## 2023-08-22 VITALS
HEIGHT: 60 IN | RESPIRATION RATE: 20 BRPM | TEMPERATURE: 97.6 F | OXYGEN SATURATION: 93 % | HEART RATE: 75 BPM | BODY MASS INDEX: 46.25 KG/M2 | SYSTOLIC BLOOD PRESSURE: 157 MMHG | DIASTOLIC BLOOD PRESSURE: 86 MMHG

## 2023-08-22 DIAGNOSIS — M25.512 ACUTE PAIN OF LEFT SHOULDER: Primary | ICD-10-CM

## 2023-08-22 DIAGNOSIS — M79.18 MUSCULOSKELETAL PAIN: ICD-10-CM

## 2023-08-22 DIAGNOSIS — M54.10 RADICULOPATHY AFFECTING UPPER EXTREMITY: ICD-10-CM

## 2023-08-22 PROCEDURE — 71045 X-RAY EXAM CHEST 1 VIEW: CPT

## 2023-08-22 PROCEDURE — 99283 EMERGENCY DEPT VISIT LOW MDM: CPT

## 2023-08-22 RX ORDER — METHYLPREDNISOLONE 4 MG/1
TABLET ORAL
Qty: 1 EACH | Refills: 0 | Status: SHIPPED | OUTPATIENT
Start: 2023-08-22 | End: 2023-08-22

## 2023-08-22 RX ORDER — METHOCARBAMOL 750 MG/1
750 TABLET, FILM COATED ORAL 3 TIMES DAILY PRN
Qty: 21 TABLET | Refills: 0 | Status: SHIPPED | OUTPATIENT
Start: 2023-08-22

## 2023-08-22 RX ORDER — LIDOCAINE 50 MG/G
1 PATCH TOPICAL EVERY 24 HOURS
Qty: 15 PATCH | Refills: 0 | Status: SHIPPED | OUTPATIENT
Start: 2023-08-22

## 2023-08-22 RX ORDER — OXYCODONE HYDROCHLORIDE AND ACETAMINOPHEN 5; 325 MG/1; MG/1
1 TABLET ORAL ONCE
Status: COMPLETED | OUTPATIENT
Start: 2023-08-22 | End: 2023-08-22

## 2023-08-22 RX ORDER — METHOCARBAMOL 750 MG/1
750 TABLET, FILM COATED ORAL ONCE
Status: COMPLETED | OUTPATIENT
Start: 2023-08-22 | End: 2023-08-22

## 2023-08-22 RX ORDER — LIDOCAINE 50 MG/G
1 PATCH TOPICAL
Status: DISCONTINUED | OUTPATIENT
Start: 2023-08-22 | End: 2023-08-22

## 2023-08-22 RX ORDER — OXYCODONE HYDROCHLORIDE AND ACETAMINOPHEN 5; 325 MG/1; MG/1
1 TABLET ORAL ONCE
Status: DISCONTINUED | OUTPATIENT
Start: 2023-08-22 | End: 2023-08-22 | Stop reason: HOSPADM

## 2023-08-22 RX ORDER — LIDOCAINE 50 MG/G
1 PATCH TOPICAL
Status: DISCONTINUED | OUTPATIENT
Start: 2023-08-22 | End: 2023-08-22 | Stop reason: HOSPADM

## 2023-08-22 RX ADMIN — METHOCARBAMOL TABLETS 750 MG: 750 TABLET, COATED ORAL at 02:51

## 2023-08-22 RX ADMIN — OXYCODONE HYDROCHLORIDE AND ACETAMINOPHEN 1 TABLET: 5; 325 TABLET ORAL at 03:48

## 2023-08-22 RX ADMIN — LIDOCAINE 1 PATCH: 50 PATCH TOPICAL at 03:24

## 2023-08-22 RX ADMIN — OXYCODONE HYDROCHLORIDE AND ACETAMINOPHEN 1 TABLET: 5; 325 TABLET ORAL at 02:51

## 2023-08-22 NOTE — ED PROVIDER NOTES
EMERGENCY DEPARTMENT ENCOUNTER    Room Number:  03/03  PCP: Provider, No Known      HPI:  Chief Complaint: Left shoulder pain     A complete HPI/ROS/PMH/PSH/SH/FH are unobtainable due to: Nothing  Context: William Shah Jr. is a 68 y.o. male who presents to the ED c/o left shoulder pain that has been ongoing for approximately 3 weeks.  Patient states he was initially working out with dumbbells trying to maintain upper body strength when the pain initially began.  Pain is said to be on the left interscapular.  Movement makes it worse.  He does complain of radiating pain into the left arm.  He denies shortness of breath, fever, chills, chest pain, pain with deep breathing.  He states the pain is clearly exacerbated by certain movements.  He states he saw an orthopedist earlier today and was given a short course of hydrocodone.  This was not controlling his pain.  He was to follow-up in the ED should his pain once again become out of control.  He presents tonight complaining of uncontrolled pain and states it is a 9 out of 10 on the pain scale at present.  He is here for further evaluation.    Patient is anticoagulated with Xarelto due to PMH of factor V Leiden deficiency and history of SLE          PAST MEDICAL HISTORY  Active Ambulatory Problems     Diagnosis Date Noted    Biceps tendon tear 01/23/2018    S/P arthroscopy of shoulder 04/10/2018    Bursitis/tendonitis, shoulder 05/15/2018    Hypovolemic shock 05/23/2019    MARIE (acute kidney injury) 11/22/2022    Alcohol abuse 06/07/2016    Amputation stump complication 10/17/2014    Anxiety 02/24/2014    Anxiety and depression 03/23/2023    CAD (coronary artery disease) 02/24/2014    Chronic pain disorder 03/23/2023    Chronic systolic CHF (congestive heart failure) 06/24/2017    Immobility syndrome 11/22/2022    Narcotic addiction 03/23/2023     Resolved Ambulatory Problems     Diagnosis Date Noted    Altered mental status 03/28/2021     Past Medical History:    Diagnosis Date    Amputated below knee     Angina pectoris     Atherosclerotic heart disease     Bowel perforation     Chronic pain syndrome     Congenital absence of lower limb     Constipation     Coordination impairment     Esophagitis     Factor 5 Leiden mutation, heterozygous     Gait abnormality     GERD (gastroesophageal reflux disease)     Hyperlipidemia     Hypertension     Insomnia     Lupus     Lupus (systemic lupus erythematosus)     Major depressive disorder     MI (myocardial infarction)     Mobility impaired     Muscle spasm     Neuropathy     On continuous oral anticoagulation     Open wound     Pancreatitis     Peripheral vascular disease     Polyneuropathy     Shoulder pain, left     Staph infection     Traumatic amputation of leg above knee          PAST SURGICAL HISTORY  Past Surgical History:   Procedure Laterality Date    APPENDECTOMY      BACK SURGERY      neck fusion     CARPAL TUNNEL RELEASE      COLON RESECTION      CORONARY ANGIOPLASTY WITH STENT PLACEMENT      FASCIOTOMY      left leg    HERNIA REPAIR      HERNIA REPAIR      4 of them bilateral inguinal and a double     LEG AMPUTATION      above  knee right, below knee left     NERVE BLOCK      neck for RSD    ORIF WRIST FRACTURE Right     ROTATOR CUFF REPAIR Right     SHOULDER ARTHROSCOPY Left 2018    Procedure: LEFT SHOULDER ARTHROSCOPY WITH SUBACROMIAL DECOMPRESSION, DEBRIDEMENT OF ROTATOR CUFF, DEBRIDEMENT OF LABRUM, STERIOD INJECTION  AND BICEPS TENOTOMY;  Surgeon: Hawa Ellsworth MD;  Location: [facility] OR [facility];  Service:     SKIN GRAFT      TONSILLECTOMY           FAMILY HISTORY  Family History   Problem Relation Age of Onset    Malig Hyperthermia Neg Hx          SOCIAL HISTORY  Social History     Socioeconomic History    Marital status:    Tobacco Use    Smoking status: Former     Packs/day: 1.00     Years: 10.00     Pack years: 10.00     Types: Cigarettes     Quit date:      Years since quittin.6     Smokeless tobacco: Never   Vaping Use    Vaping Use: Never used   Substance and Sexual Activity    Alcohol use: No    Drug use: No         ALLERGIES  Morphine and related, Penicillins, Adhesive tape, and Latex        REVIEW OF SYSTEMS  Review of Systems     All systems reviewed and negative except for those discussed in HPI.       PHYSICAL EXAM  ED Triage Vitals [08/22/23 0135]   Temp Heart Rate Resp BP SpO2   97.6 øF (36.4 øC) 80 20 163/87 96 %      Temp src Heart Rate Source Patient Position BP Location FiO2 (%)   -- -- -- -- --       Physical Exam      GENERAL: WDWN male, appears uncomfortable.  HENT: nares patent  EYES: no scleral icterus  CV: regular rhythm, normal rate, normal S1-S2  RESPIRATORY: normal effort, lungs CTA B  ABDOMEN: soft, nontender  MUSCULOSKELETAL: Left shoulder: TTP interscapular in the vicinity of the rhomboid.  NEURO: alert, moves all extremities, follows commands  PSYCH:  calm, cooperative  SKIN: warm, dry    Vital signs and nursing notes reviewed.          LAB RESULTS  No results found for this or any previous visit (from the past 24 hour(s)).    Ordered the above labs and reviewed the results.        RADIOLOGY  XR Chest 1 View    Result Date: 8/22/2023  SINGLE VIEW OF THE CHEST  HISTORY: Chest pain  COMPARISON: March 28, 2021  FINDINGS: There is cardiomegaly. There is no vascular congestion. Exam is compromised by patient's positioning. No pneumothorax or obvious pleural effusion is seen.      No acute findings.  This report was finalized on 8/22/2023 3:19 AM by Dr. Obdulia Bell M.D.       Ordered the above noted radiological studies. Reviewed by me in PACS.          PROCEDURES  Procedures          MEDICATIONS GIVEN IN ER  Medications   oxyCODONE-acetaminophen (PERCOCET) 5-325 MG per tablet 1 tablet (1 tablet Oral Given 8/22/23 0251)   methocarbamol (ROBAXIN) tablet 750 mg (750 mg Oral Given 8/22/23 0251)   oxyCODONE-acetaminophen (PERCOCET) 5-325 MG per tablet 1 tablet (1 tablet  Oral Given 8/22/23 0348)         MEDICAL DECISION MAKING, PROGRESS, and CONSULTS    Discussion below represents my analysis of pertinent findings related to patient's condition, differential diagnosis, treatment plan and final disposition.      Orders placed during this visit:  Orders Placed This Encounter   Procedures    XR Chest 1 View         Additional sources:  - Discussed/obtained information from independent historians: None available  Additional information was obtained to confirm the patient's history.    - External (non-ED) record review: Reviewed the orthopedic note dictated on 8/21/2023 by Dr. Anat Malcolm.  AP, scapular Y, axillary lateral views of the shoulder were obtained.  There are no further details in epic but according to the patient he was given a short course of hydrocodone.  He was to follow-up routinely.            - Chronic or social conditions impacting care: None        Differential diagnosis:    Rhomboid muscle strain, pinched nerve, PNA, PTX, less likely ACS, PE.  Patient is anticoagulated with Xarelto.  SPO2 is 97 to 98%.  Heart rate normal.  Doubt PE.  Pain is can clearly be localized to the soft tissue/musculoskeletal area intrascapular on the left.  There is also an MRI.  Doubt ACS, PTX, PNA.  We will go ahead and obtain a chest x-ray to further evaluate.  Suspect this is musculoskeletal in origin.  Cannot rule out a pinched nerve.          Independent interpretation of labs, radiology studies, and discussions with consultants:  ED Course as of 08/23/23 0253   Tue Aug 22, 2023   0315 BP: 163/87 [RC]   0315 Temp: 97.6 øF (36.4 øC) [RC]   0315 Heart Rate: 80 [RC]   0315 Resp: 20 [RC]   0315 SpO2: 96 %  Room air [RC]   0323 My individual interpretation of the patient's chest x-ray is no acute process.  This is confirmed with the radiologist official read. [RC]   0334 Patient's pain is better after p.o. Percocet, lidocaine patch, muscle relaxer.  He is describing a radicular  component to his pain into the left arm.  Suspect could potentially be nerve entrapment.  We will go ahead and treat with lidocaine patches, muscle relaxer, have him to continue with his hydrocodone, and placed on a short course of Medrol.  Patient's blood pressure is well controlled he is not diabetic.  Blood and follow-up with his orthopedist for further evaluation.  His family doctor for interim management as needed, and to return to the ED should his symptoms worsen or should he have any further concerns. [RC]   1415 Patient informs me he is unable to take steroids.  We will treat with muscle relaxer, continued hydrocodone, and lidocaine patch.  We will cancel the Medrol order. [RC]   0716 Ambulance transfer service called.  They have no wheelchair van's available.  EMS will try to take him home at 11 AM. [EE]      ED Course User Index  [EE] Kodi Lam PA  [RC] Judd Zhu III, PA               DIAGNOSIS  Final diagnoses:   Acute pain of left shoulder   Radiculopathy affecting upper extremity   Musculoskeletal pain-rhomboid muscle spasm         DISPOSITION  DISCHARGE    Patient discharged in stable condition.    Reviewed implications of results, diagnosis, meds, responsibility to follow up, warning signs and symptoms of possible worsening, potential complications and reasons to return to ER.    Patient/Family voiced understanding of above instructions.    Discussed plan for discharge, as there is no emergent indication for admission. Patient referred to primary care provider for BP management due to today's BP. Pt/family is agreeable and understands need for follow up and repeat testing.  Pt is aware that discharge does not mean that nothing is wrong but it indicates no emergency is present that requires admission and they must continue care with follow-up as given below or physician of their choice.     FOLLOW-UP  Hawa Ellsworth MD  [address]  [address]  [phone]    Schedule an appointment as soon as possible for a visit   For further evaluation and treatment    Your family physician    Schedule an appointment as soon as possible for a visit   For further evaluation and treatment and interim management         Medication List        New Prescriptions      lidocaine 5 %  Commonly known as: LIDODERM  Place 1 patch on the skin as directed by provider Daily. Remove & Discard patch within 12 hours or as directed by MD     methocarbamol 750 MG tablet  Commonly known as: ROBAXIN  Take 1 tablet by mouth 3 (Three) Times a Day As Needed for Muscle Spasms.            Stop      tiZANidine 4 MG tablet  Commonly known as: ZANAFLEX               Where to Get Your Medications        These medications were sent to [facility] - [place], [place] - [address] [facility] - [phone]  - [phone]   [address] [facility], [facility] [address]      Phone: [phone]   lidocaine 5 %  methocarbamol 750 MG tablet            Latest Documented Vital Signs:  As of 02:53 EDT  BP- 157/86 HR- 75 Temp- 97.6 øF (36.4 øC) O2 sat- 93%      --    Please note that portions of this were completed with a voice recognition program.       Note Disclaimer: At [facility], we believe that sharing information builds trust and better relationships. You are receiving this note because you are receiving care at [facility] or recently visited. It is possible you will see health information before a provider has talked with you about it. This kind of information can be easy to misunderstand. To help you fully understand what it means for your health, we urge you to discuss this note with your provider.         Judd Zhu III, PA  08/22/23 0502       Judd Zhu III, PA  08/23/23 0256

## 2023-08-22 NOTE — TELEPHONE ENCOUNTER
Caller: William Shah Jr.    Relationship to patient: Self    Best call back number: [phone]    Patient is needing: PATIENT WAS TOLD THAT IF HE WASN'T BETTER TODAY TO GO TO ED. HE WENT AND GOT AN XRAY BUT THEY DID NOTHING FOR HIM. PN IN EPIC

## 2023-08-22 NOTE — TELEPHONE ENCOUNTER
I looked him up this morning and saw that he went to the ER.  I saw they gave him lidocaine patches muscle relaxers and some Percocet lets see if that does not calm it down they feel like it is nerve as well if his symptoms persist I will have him see Nell

## 2023-08-22 NOTE — ED TRIAGE NOTES
Pt presents to ED via [facility] from his home for left shoulder pain worsening Xs 3.5 weeks. Pt was instructed by ortho yesterday to come to ER if the pain medication did not help. Pt is able to lift his left arm above his head in triage without difficulty. Pt denies chest pain or any other complaints.

## 2023-08-22 NOTE — ED PROVIDER NOTES
MD ATTESTATION NOTE    The KALYN and I have discussed this patient's history, physical exam, and treatment plan.  I have reviewed the documentation and personally had a face to face interaction with the patient. I affirm the documentation and agree with the treatment and plan.  The attached note describes my personal findings.      I provided a substantive portion of the care of the patient.  I personally performed the physical exam in its entirety, and below are my findings.  For this patient encounter, the patient wore surgical mask, I wore full protective PPE including N95 and eye protection.      Brief HPI: This patient is a 68-year-old male presenting to the emergency room today with left scapular discomfort that has been present now for the past 3 weeks.  He states that the pain is made worse by touch as well as movements.  He states that he did follow-up with orthopedics, Dr. Ellsworth, yesterday and was given hydrocodone which is not helping his discomfort.  He denies chest pain, shortness of breath, or fever/chills.      PHYSICAL EXAM  ED Triage Vitals [08/22/23 0135]   Temp Heart Rate Resp BP SpO2   97.6 øF (36.4 øC) 80 20 163/87 96 %      Temp src Heart Rate Source Patient Position BP Location FiO2 (%)   -- -- -- -- --         GENERAL: Resting comfortably and in no acute distress, nontoxic in appearance  HENT: nares patent  EYES: no scleral icterus  CV: regular rhythm, normal rate, no M/R/G  RESPIRATORY: normal effort, lungs clear bilaterally  ABDOMEN: soft, nontender, no rebound or guarding  MUSCULOSKELETAL: no deformity, no edema, tenderness to palpation to the left mid scapula with reproduction of symptoms but without deformity  NEURO: alert, moves all extremities, follows commands  PSYCH:  calm, cooperative  SKIN: warm, dry    Vital signs and nursing notes reviewed.        Plan: We will treat the patient's discomfort with a oral medication as well as muscle relaxer as we obtain a chest x-ray in the  emergency room today.  We will monitor and reassess following.       Sander Avila MD  08/22/23 4024

## 2023-09-07 RX ORDER — TRAZODONE HYDROCHLORIDE 100 MG/1
TABLET ORAL
Qty: 120 TABLET | Refills: 0 | OUTPATIENT
Start: 2023-09-07

## 2023-09-07 RX ORDER — ONDANSETRON 4 MG/1
TABLET, FILM COATED ORAL
Qty: 90 TABLET | Refills: 0 | OUTPATIENT
Start: 2023-09-07

## 2023-09-07 RX ORDER — CLONIDINE HYDROCHLORIDE 0.1 MG/1
TABLET ORAL
Qty: 120 TABLET | Refills: 1 | OUTPATIENT
Start: 2023-09-07

## 2023-09-07 RX ORDER — VENLAFAXINE HYDROCHLORIDE 150 MG/1
CAPSULE, EXTENDED RELEASE ORAL
Qty: 120 CAPSULE | Refills: 0 | OUTPATIENT
Start: 2023-09-07

## 2023-09-07 RX ORDER — RIVAROXABAN 20 MG/1
TABLET, FILM COATED ORAL
Qty: 90 TABLET | Refills: 0 | OUTPATIENT
Start: 2023-09-07

## 2023-09-07 RX ORDER — TIZANIDINE 4 MG/1
TABLET ORAL
Qty: 30 TABLET | Refills: 0 | OUTPATIENT
Start: 2023-09-07

## 2023-09-07 RX ORDER — SERTRALINE HYDROCHLORIDE 100 MG/1
TABLET, FILM COATED ORAL
Qty: 60 TABLET | Refills: 0 | OUTPATIENT
Start: 2023-09-07

## 2023-09-07 RX ORDER — FOLIC ACID 1 MG/1
TABLET ORAL
Qty: 90 TABLET | Refills: 0 | OUTPATIENT
Start: 2023-09-07

## 2023-10-06 RX ORDER — TIZANIDINE 4 MG/1
TABLET ORAL
Qty: 30 TABLET | Refills: 3 | OUTPATIENT
Start: 2023-10-06

## 2023-10-06 RX ORDER — FOLIC ACID 1 MG/1
TABLET ORAL
Qty: 90 TABLET | Refills: 3 | OUTPATIENT
Start: 2023-10-06

## 2023-10-06 RX ORDER — TRAZODONE HYDROCHLORIDE 100 MG/1
TABLET ORAL
Qty: 120 TABLET | Refills: 3 | OUTPATIENT
Start: 2023-10-06

## 2023-10-06 RX ORDER — ONDANSETRON 4 MG/1
TABLET, FILM COATED ORAL
Qty: 90 TABLET | Refills: 3 | OUTPATIENT
Start: 2023-10-06

## 2023-10-06 RX ORDER — RIVAROXABAN 20 MG/1
TABLET, FILM COATED ORAL
Qty: 90 TABLET | Refills: 3 | OUTPATIENT
Start: 2023-10-06

## 2023-10-06 RX ORDER — SERTRALINE HYDROCHLORIDE 100 MG/1
TABLET, FILM COATED ORAL
Qty: 60 TABLET | Refills: 3 | OUTPATIENT
Start: 2023-10-06

## 2023-10-06 RX ORDER — VENLAFAXINE HYDROCHLORIDE 150 MG/1
CAPSULE, EXTENDED RELEASE ORAL
Qty: 120 CAPSULE | Refills: 3 | OUTPATIENT
Start: 2023-10-06

## 2023-11-06 RX ORDER — SERTRALINE HYDROCHLORIDE 100 MG/1
TABLET, FILM COATED ORAL
Qty: 60 TABLET | Refills: 0 | OUTPATIENT
Start: 2023-11-06

## 2023-11-06 RX ORDER — ONDANSETRON 4 MG/1
TABLET, FILM COATED ORAL
Qty: 90 TABLET | Refills: 0 | OUTPATIENT
Start: 2023-11-06

## 2023-11-06 RX ORDER — FOLIC ACID 1 MG/1
TABLET ORAL
Qty: 90 TABLET | Refills: 0 | OUTPATIENT
Start: 2023-11-06

## 2023-11-06 RX ORDER — TIZANIDINE 4 MG/1
TABLET ORAL
Qty: 30 TABLET | Refills: 0 | OUTPATIENT
Start: 2023-11-06

## 2023-11-06 RX ORDER — RIVAROXABAN 20 MG/1
20 TABLET, FILM COATED ORAL
Qty: 90 TABLET | Refills: 0 | OUTPATIENT
Start: 2023-11-06

## 2023-11-06 RX ORDER — TRAZODONE HYDROCHLORIDE 100 MG/1
100 TABLET ORAL
Qty: 120 TABLET | Refills: 0 | OUTPATIENT
Start: 2023-11-06

## 2023-11-06 RX ORDER — VENLAFAXINE HYDROCHLORIDE 150 MG/1
150 CAPSULE, EXTENDED RELEASE ORAL DAILY
Qty: 120 CAPSULE | Refills: 0 | OUTPATIENT
Start: 2023-11-06

## 2024-03-07 ENCOUNTER — OFFICE VISIT (OUTPATIENT)
Dept: CARDIOLOGY | Facility: CLINIC | Age: 69
End: 2024-03-07
Payer: MEDICARE

## 2024-03-07 ENCOUNTER — TELEPHONE (OUTPATIENT)
Dept: CARDIOLOGY | Facility: CLINIC | Age: 69
End: 2024-03-07

## 2024-03-07 VITALS — SYSTOLIC BLOOD PRESSURE: 138 MMHG | HEART RATE: 72 BPM | DIASTOLIC BLOOD PRESSURE: 78 MMHG

## 2024-03-07 DIAGNOSIS — I25.10 CORONARY ARTERY DISEASE INVOLVING NATIVE CORONARY ARTERY OF NATIVE HEART WITHOUT ANGINA PECTORIS: ICD-10-CM

## 2024-03-07 DIAGNOSIS — I10 ESSENTIAL HYPERTENSION: ICD-10-CM

## 2024-03-07 DIAGNOSIS — I73.9 PERIPHERAL ARTERIAL DISEASE: ICD-10-CM

## 2024-03-07 DIAGNOSIS — E78.2 MIXED HYPERLIPIDEMIA: ICD-10-CM

## 2024-03-07 DIAGNOSIS — I50.22 CHRONIC SYSTOLIC (CONGESTIVE) HEART FAILURE: Primary | ICD-10-CM

## 2024-03-07 RX ORDER — CHLORDIAZEPOXIDE HYDROCHLORIDE 25 MG/1
25 CAPSULE, GELATIN COATED ORAL
COMMUNITY
Start: 2023-11-07

## 2024-03-07 RX ORDER — CARVEDILOL 12.5 MG/1
12.5 TABLET ORAL 2 TIMES DAILY WITH MEALS
COMMUNITY
Start: 2024-03-04

## 2024-03-07 RX ORDER — TEMAZEPAM 30 MG/1
30 CAPSULE ORAL NIGHTLY PRN
COMMUNITY
Start: 2024-03-01

## 2024-03-07 RX ORDER — OMEPRAZOLE 20 MG/1
20 CAPSULE, DELAYED RELEASE ORAL DAILY
COMMUNITY
Start: 2024-02-20

## 2024-03-07 NOTE — TELEPHONE ENCOUNTER
Caller: ANGEL LUIS    Relationship: [facility]    Best call back number: [phone]    What is the best time to reach you: ANYTIME    Who are you requesting to speak with (clinical staff, provider,  specific staff member): CLINICAL    What was the call regarding: ANGEL LUIS FROM [facility] IS CALLING TO GIVE US CONTACT INFO TO SEND ORDERS OR RESULTS TO FAX NUMBER: [phone]. WE CAN SEND INFO TO ANJANA MON FOR ORDERS.

## 2024-03-07 NOTE — PROGRESS NOTES
[facility]      Patient Name: William Shah Jr.  :1955  Age: 68 y.o.  Encounter Provider:  Ray Jean Baptiste Jr, MD      Chief Complaint:   Chief Complaint   Patient presents with    Coronary artery disease involving native coronary artery of    Hypertension         Hypertension  Pertinent negatives include no chest pain, orthopnea, palpitations or PND.   William Shah Jr. is a 68 y.o. male past medical history of CAD status post previous MI with angioplasty to both the LAD and ramus in  who presents for routine follow-up.  Previously seen by Dr. Rockwell but this is his first time in clinic since  and I have reviewed all pertinent electronic health records.  He has history of severe PAD status post BKA on the left and AKA on the right.  History of lupus anticoagulant on rivaroxaban.  He is not currently on antiplatelet therapy.  Blood pressure has been very poorly controlled at his skilled nursing facility.  He is in sinus rhythm with chronic right bundle branch block on EKG today and no acute ischemic changes.  Patient denies angina.  No orthopnea or PND.  No palpitations, dizziness or syncope.      The following portions of the patient's history were reviewed and updated as appropriate: allergies, current medications, past family history, past medical history, past social history, past surgical history and problem list.      Review of Systems   Constitutional: Negative for chills and fever.   HENT:  Negative for hoarse voice and sore throat.    Eyes:  Negative for double vision and photophobia.   Cardiovascular:  Negative for chest pain, leg swelling, near-syncope, orthopnea, palpitations, paroxysmal nocturnal dyspnea and syncope.   Respiratory:  Negative for cough and wheezing.    Skin:  Negative for poor wound healing and rash.   Musculoskeletal:  Negative for arthritis and joint swelling.   Gastrointestinal:  Negative for bloating, abdominal pain, hematemesis and  "hematochezia.   Neurological:  Negative for dizziness and focal weakness.   Psychiatric/Behavioral:  Negative for depression and suicidal ideas.      OBJECTIVE:   Vital Signs  Vitals:    03/07/24 1057   BP: 138/78   Pulse: 72     Estimated body mass index is 46.25 kg/m² as calculated from the following:    Height as of 8/22/23: 139.7 cm (55\").    Weight as of 8/21/23: 90.3 kg (199 lb).    Vitals reviewed.   Constitutional:       Appearance: Healthy appearance. Not in distress.   Neck:      Vascular: No JVR. JVD normal.   Pulmonary:      Effort: Pulmonary effort is normal.      Breath sounds: No wheezing. No rhonchi. No rales.   Chest:      Chest wall: Not tender to palpatation.   Cardiovascular:      PMI at left midclavicular line. Normal rate. Regular rhythm. Normal S1. Normal S2.       Murmurs: There is no murmur.      No gallop.  No click. No rub.   Pulses:     Intact distal pulses.   Edema:     Peripheral edema absent.   Abdominal:      General: Bowel sounds are normal.      Palpations: Abdomen is soft.      Tenderness: There is no abdominal tenderness.   Musculoskeletal:         General: Tenderness and deformity present. Skin:     General: Skin is warm and dry.   Neurological:      General: No focal deficit present.      Mental Status: Alert and oriented to person, place and time.       ECG 12 Lead    Date/Time: 3/7/2024 10:58 AM  Performed by: Ray Jean Baptiste Jr., MD    Authorized by: Ray Jean Baptiste Jr., MD  Comparison: compared with previous ECG from 5/26/2021  Similar to previous ECG  Rhythm: sinus rhythm  Conduction: right bundle branch block and left posterior fascicular block  Other findings: left atrial abnormality    Clinical impression: abnormal EKG           BUN   Date Value Ref Range Status   03/21/2023 5 (L) 8 - 23 mg/dL Final   03/15/2023 9 8 - 26 mg/dL Final     Creatinine   Date Value Ref Range Status   03/21/2023 0.73 (L) 0.76 - 1.27 mg/dL Final   03/15/2023 0.63 (L) 0.73 - 1.18 mg/dL Final "     Potassium   Date Value Ref Range Status   09/19/2023 3.6 3.5 - 5.1 mmol/L Final     Comment:     Falsely elevated potassium can occur in patients with high WBC or platelet counts.     ALT (SGPT)   Date Value Ref Range Status   03/21/2023 18 1 - 41 U/L Final   03/11/2023 13 0 - 55 U/L Final     AST (SGOT)   Date Value Ref Range Status   03/21/2023 21 1 - 40 U/L Final   03/11/2023 22 5 - 34 U/L Final           ASSESSMENT:     68-year-old male with severe PAD status post bilateral amputations, CAD without angina, uncontrolled hypertension presents for routine follow-up      PLAN OF CARE:     Uncontrolled hypertension -every high blood pressure on home blood pressure log.  Add spironolactone.  Check twice daily blood pressure log for the next 2 weeks.  Check BMP in 2 weeks.  I will coordinate with his skilled nursing facility for routine outpatient surveillance.  CAD -patient had abnormal stress study in 2021 that was treated medically.  EF was 39% on SPECT gated imaging.  He has no reported symptoms but he has very limited mobility.  We will repeat echocardiogram so we understand how to optimize goal-directed medical therapy if LV function remains down.  Not currently on antiplatelet therapy and we will add Plavix.  PAD -add Plavix and continue statin.  Will need repeat lipid profile within the next 6 to 12 months.  Mixed hyperlipidemia  History of lupus anticoagulant on rivaroxaban    Return to clinic 3 months             Discharge Medications            Accurate as of March 7, 2024 10:58 AM. If you have any questions, ask your nurse or doctor.                Continue These Medications        Instructions Start Date   Acetaminophen Extra Strength 500 MG tablet   TAKE ONE TABLET BY MOUTH EVERY 6 HOURS AS NEEDED      amLODIPine 5 MG tablet  Commonly known as: NORVASC   5 mg, Oral, Daily      atorvastatin 40 MG tablet  Commonly known as: LIPITOR   40 mg, Oral, Daily      Calcium Carbonate 500 MG chewable tablet    1,000 mg, Oral, Every 6 Hours PRN      cloNIDine 0.1 MG tablet  Commonly known as: CATAPRES   TAKE ONE TABLET BY MOUTH TWICE DAILY      diazePAM 5 MG tablet  Commonly known as: VALIUM   TAKE TWO TABLETS BY MOUTH EVERY 8 HOURS FOR 2 DAYS, THEN ONE TABLET EVERY 8 HOURS FOR 2 DAYS, THEN ONE TABLET EVERY TWELVE HOURS FOR 2 DAYS, THEN ONE TABLET DAILY FOR 2 DAYS. MAX DAILY DOSE IS SIX TABLETS      diphenhydrAMINE-zinc acetate 1-0.1 % cream  Commonly known as: BENADRYL   1 application , Topical, Every 12 Hours PRN, Apply to L shoulder      docusate sodium 250 MG capsule  Commonly known as: COLACE   250 mg, Oral, Daily      esomeprazole 20 MG capsule  Commonly known as: nexIUM   20 mg, Oral, Every Morning Before Breakfast      Floranex pack oral packet   1 packet, Oral, Daily      folic acid 1 MG tablet  Commonly known as: FOLVITE   TAKE ONE TABLET BY MOUTH DAILY      gabapentin 100 MG capsule  Commonly known as: NEURONTIN   100 mg, Oral, 3 Times Daily      gabapentin 400 MG capsule  Commonly known as: NEURONTIN   1 capsule, 3 Times Daily      HYDROcodone-acetaminophen 5-325 MG per tablet  Commonly known as: NORCO   1 tablet, Oral, Every 6 Hours PRN      hydrocortisone 1 % cream   1 application , Topical, 2 Times Daily PRN      lidocaine 5 %  Commonly known as: LIDODERM   1 patch, Transdermal, Every 24 Hours, Remove & Discard patch within 12 hours or as directed by MD      lisinopril 40 MG tablet  Commonly known as: PRINIVIL,ZESTRIL   40 mg      LORazepam 1 MG tablet  Commonly known as: ATIVAN   No dose, route, or frequency recorded.      Lucemyra 0.18 MG tablet  Generic drug: Lofexidine HCl   See Admin Instructions      methocarbamol 750 MG tablet  Commonly known as: ROBAXIN   750 mg, Oral, 3 Times Daily PRN      metoprolol succinate XL 50 MG 24 hr tablet  Commonly known as: TOPROL-XL   50 mg, Oral, Daily      ondansetron 4 MG tablet  Commonly known as: ZOFRAN   TAKE ONE TABLET BY MOUTH EVERY 8 HOURS AS NEEDED       polyethylene glycol 17 GM/SCOOP powder  Commonly known as: MIRALAX   17 g, Oral      sertraline 100 MG tablet  Commonly known as: ZOLOFT   TAKE TWO TABLETS BY MOUTH DAILY      traZODone 100 MG tablet  Commonly known as: DESYREL   100 mg, Oral, Nightly      venlafaxine  MG 24 hr capsule  Commonly known as: EFFEXOR-XR   150 mg, Oral, Daily      Xarelto 20 MG tablet  Generic drug: rivaroxaban   TAKE ONE TABLET BY MOUTH EVERY EVENING               Thank you for allowing me to participate in the care of your patient,      Sincerely,   Ray Jean Baptiste MD  [facility]  03/07/24  10:58 EST

## 2024-03-20 ENCOUNTER — HOSPITAL ENCOUNTER (OUTPATIENT)
Dept: CARDIOLOGY | Facility: HOSPITAL | Age: 69
Discharge: HOME | End: 2024-03-20
Admitting: INTERNAL MEDICINE
Payer: MEDICARE

## 2024-03-20 VITALS
DIASTOLIC BLOOD PRESSURE: 62 MMHG | SYSTOLIC BLOOD PRESSURE: 126 MMHG | BODY MASS INDEX: 39.07 KG/M2 | HEART RATE: 75 BPM | OXYGEN SATURATION: 94 % | HEIGHT: 60 IN | WEIGHT: 199 LBS

## 2024-03-20 DIAGNOSIS — I50.22 CHRONIC SYSTOLIC (CONGESTIVE) HEART FAILURE: ICD-10-CM

## 2024-03-20 LAB
AORTIC ARCH: 3.2 CM
AORTIC DIMENSIONLESS INDEX: 0.7 (DI)
ASCENDING AORTA: 3.6 CM
BH CV ECHO LEFT VENTRICLE GLOBAL LONGITUDINAL STRAIN: -18.5 %
BH CV ECHO MEAS - ACS: 2.07 CM
BH CV ECHO MEAS - AO MAX PG: 9.4 MMHG
BH CV ECHO MEAS - AO MEAN PG: 4.9 MMHG
BH CV ECHO MEAS - AO ROOT DIAM: 3 CM
BH CV ECHO MEAS - AO V2 MAX: 152.9 CM/SEC
BH CV ECHO MEAS - AO V2 VTI: 30 CM
BH CV ECHO MEAS - AVA(I,D): 2.9 CM2
BH CV ECHO MEAS - EDV(CUBED): 107.8 ML
BH CV ECHO MEAS - EDV(MOD-SP2): 183 ML
BH CV ECHO MEAS - EDV(MOD-SP4): 196 ML
BH CV ECHO MEAS - EF(MOD-BP): 43.5 %
BH CV ECHO MEAS - EF(MOD-SP2): 47 %
BH CV ECHO MEAS - EF(MOD-SP4): 43.4 %
BH CV ECHO MEAS - ESV(CUBED): 52.7 ML
BH CV ECHO MEAS - ESV(MOD-SP2): 97 ML
BH CV ECHO MEAS - ESV(MOD-SP4): 111 ML
BH CV ECHO MEAS - FS: 21.2 %
BH CV ECHO MEAS - IVS/LVPW: 1.02 CM
BH CV ECHO MEAS - IVSD: 1.45 CM
BH CV ECHO MEAS - LAT PEAK E' VEL: 7.5 CM/SEC
BH CV ECHO MEAS - LV DIASTOLIC VOL/BSA (35-75): 112.1 CM2
BH CV ECHO MEAS - LV MASS(C)D: 279.6 GRAMS
BH CV ECHO MEAS - LV MAX PG: 5.4 MMHG
BH CV ECHO MEAS - LV MEAN PG: 2.8 MMHG
BH CV ECHO MEAS - LV SYSTOLIC VOL/BSA (12-30): 63.5 CM2
BH CV ECHO MEAS - LV V1 MAX: 116.3 CM/SEC
BH CV ECHO MEAS - LV V1 VTI: 22.5 CM
BH CV ECHO MEAS - LVIDD: 4.8 CM
BH CV ECHO MEAS - LVIDS: 3.8 CM
BH CV ECHO MEAS - LVOT AREA: 3.9 CM2
BH CV ECHO MEAS - LVOT DIAM: 2.22 CM
BH CV ECHO MEAS - LVPWD: 1.42 CM
BH CV ECHO MEAS - MED PEAK E' VEL: 5 CM/SEC
BH CV ECHO MEAS - MR MAX PG: 61 MMHG
BH CV ECHO MEAS - MR MAX VEL: 390.5 CM/SEC
BH CV ECHO MEAS - MV A DUR: 0.12 SEC
BH CV ECHO MEAS - MV A MAX VEL: 113.9 CM/SEC
BH CV ECHO MEAS - MV DEC SLOPE: 422.9 CM/SEC2
BH CV ECHO MEAS - MV DEC TIME: 0.17 SEC
BH CV ECHO MEAS - MV E MAX VEL: 137 CM/SEC
BH CV ECHO MEAS - MV E/A: 1.2
BH CV ECHO MEAS - MV MAX PG: 6.3 MMHG
BH CV ECHO MEAS - MV MEAN PG: 2.7 MMHG
BH CV ECHO MEAS - MV P1/2T: 86.6 MSEC
BH CV ECHO MEAS - MV V2 VTI: 42 CM
BH CV ECHO MEAS - MVA(P1/2T): 2.5 CM2
BH CV ECHO MEAS - MVA(VTI): 2.08 CM2
BH CV ECHO MEAS - PA ACC TIME: 0.11 SEC
BH CV ECHO MEAS - PA V2 MAX: 90.2 CM/SEC
BH CV ECHO MEAS - PULM A REVS DUR: 0.13 SEC
BH CV ECHO MEAS - PULM A REVS VEL: 24.5 CM/SEC
BH CV ECHO MEAS - PULM DIAS VEL: 56.2 CM/SEC
BH CV ECHO MEAS - PULM S/D: 1.12
BH CV ECHO MEAS - PULM SYS VEL: 63.1 CM/SEC
BH CV ECHO MEAS - RAP SYSTOLE: 8 MMHG
BH CV ECHO MEAS - RV MAX PG: 2.5 MMHG
BH CV ECHO MEAS - RV V1 MAX: 79.2 CM/SEC
BH CV ECHO MEAS - RV V1 VTI: 15.4 CM
BH CV ECHO MEAS - RVSP: 24.6 MMHG
BH CV ECHO MEAS - SI(MOD-SP2): 49.2 ML/M2
BH CV ECHO MEAS - SI(MOD-SP4): 48.6 ML/M2
BH CV ECHO MEAS - SV(LVOT): 87.5 ML
BH CV ECHO MEAS - SV(MOD-SP2): 86 ML
BH CV ECHO MEAS - SV(MOD-SP4): 85 ML
BH CV ECHO MEAS - TAPSE (>1.6): 1.69 CM
BH CV ECHO MEAS - TR MAX PG: 16.6 MMHG
BH CV ECHO MEAS - TR MAX VEL: 203.6 CM/SEC
BH CV ECHO MEASUREMENTS AVERAGE E/E' RATIO: 21.92
BH CV XLRA - RV BASE: 3.8 CM
BH CV XLRA - RV LENGTH: 7.8 CM
BH CV XLRA - RV MID: 1.61 CM
BH CV XLRA - TDI S': 11.9 CM/SEC
LEFT ATRIUM VOLUME INDEX: 35 ML/M2
LV EF 2D ECHO EST: 50 %
SINUS: 3.4 CM
STJ: 3.4 CM

## 2024-03-20 PROCEDURE — 93306 TTE W/DOPPLER COMPLETE: CPT

## 2024-03-20 PROCEDURE — 93356 MYOCRD STRAIN IMG SPCKL TRCK: CPT

## 2024-03-20 PROCEDURE — 25510000001 PERFLUTREN (DEFINITY) 8.476 MG IN SODIUM CHLORIDE (PF) 0.9 % 10 ML INJECTION: Performed by: INTERNAL MEDICINE

## 2024-03-20 RX ADMIN — PERFLUTREN 2 ML: 6.52 INJECTION, SUSPENSION INTRAVENOUS at 14:19

## 2024-03-21 ENCOUNTER — TELEPHONE (OUTPATIENT)
Dept: CARDIOLOGY | Facility: CLINIC | Age: 69
End: 2024-03-21
Payer: MEDICARE

## 2024-03-21 DIAGNOSIS — I50.22 CHRONIC SYSTOLIC (CONGESTIVE) HEART FAILURE: ICD-10-CM

## 2024-03-21 DIAGNOSIS — I10 ESSENTIAL HYPERTENSION: ICD-10-CM

## 2024-03-21 DIAGNOSIS — Z51.81 ENCOUNTER FOR THERAPEUTIC DRUG LEVEL MONITORING: Primary | ICD-10-CM

## 2024-03-21 NOTE — TELEPHONE ENCOUNTER
Echo is stable.  Please inform patient of echo results.  I have been updated that prescription scripts were faxed to his living facility for clopidogrel and spironolactone already.  Patient is to have blood work today and we will get an update on his blood pressure after.

## 2024-03-25 RX ORDER — SPIRONOLACTONE 25 MG/1
50 TABLET ORAL DAILY
Qty: 30 TABLET | Refills: 5 | Status: SHIPPED | OUTPATIENT
Start: 2024-03-25

## 2024-03-25 RX ORDER — CARVEDILOL 25 MG/1
25 TABLET ORAL 2 TIMES DAILY WITH MEALS
Start: 2024-03-25

## 2024-03-25 NOTE — TELEPHONE ENCOUNTER
Please call and relay message to patient.  Renal function is stable however blood pressure remains uncontrolled.  His nursing staff needs to be informed that I would like to  increase spironolactone  from 25 mg daily to 50 mg daily and arrange for repeat BMP in 1 week.  At that time please repeat BMP we will need an update on both blood pressure and pulse readings. Orders are in, please fax

## 2024-03-25 NOTE — TELEPHONE ENCOUNTER
I called the skilled nursing facility and actually was able to s/w the NP(Toro), I relayed your message.  She states she already increased the Spironolactone to 25 mg BID 4-5 days ago and already placed an order for a BMP this week.  She would like to know if you want to increase the dose of the Spironolactone?  Toro called and left a message on my voicemail around 1:20pm asking to s/w you or [facility] regarding pt's BP.  Toro can be reached at [phone].  Thanks/jlf

## 2024-03-25 NOTE — TELEPHONE ENCOUNTER
Please see lab and BP results.  I placed the results in your inbox for your review.  Thanks/tk    I s/w pt regarding his echo results on 3/21/24(under telephone note from 3/7/24)

## 2024-03-25 NOTE — TELEPHONE ENCOUNTER
I spoke with NP Toro.  We will continue spironolactone at current dose.  I updated chart to reflect carvedilol 25 mg twice daily as reportedly taken.  He remains on clonidine 0.1 mg twice daily he is also on hydralazine 3 times daily.  I have recommended James ia stopping lisinopril 40 mg and switching to Entresto 49-51 twice daily. Toro is placing the order to their pharmacy for me.  She will also arrange for repeat BMP in 1 week.

## 2024-04-11 ENCOUNTER — TRANSCRIBE ORDERS (OUTPATIENT)
Dept: ADMINISTRATIVE | Facility: HOSPITAL | Age: 69
End: 2024-04-11
Payer: MEDICARE

## 2024-05-20 ENCOUNTER — OFFICE VISIT (OUTPATIENT)
Age: 69
End: 2024-05-20
Payer: MEDICARE

## 2024-05-20 VITALS
WEIGHT: 199 LBS | HEIGHT: 60 IN | BODY MASS INDEX: 39.07 KG/M2 | SYSTOLIC BLOOD PRESSURE: 167 MMHG | DIASTOLIC BLOOD PRESSURE: 79 MMHG

## 2024-05-20 DIAGNOSIS — T87.9 AMPUTATION STUMP COMPLICATION: ICD-10-CM

## 2024-05-20 DIAGNOSIS — I70.1 ATHEROSCLEROSIS OF RENAL ARTERY: Primary | ICD-10-CM

## 2024-05-20 DIAGNOSIS — Z89.611 HX OF AKA (ABOVE KNEE AMPUTATION), RIGHT: ICD-10-CM

## 2024-05-20 DIAGNOSIS — Z89.512 HX OF BKA, LEFT: ICD-10-CM

## 2024-05-20 RX ORDER — ATORVASTATIN CALCIUM 10 MG/1
TABLET, FILM COATED ORAL
COMMUNITY

## 2024-05-20 RX ORDER — HYDRALAZINE HYDROCHLORIDE 25 MG/1
25 TABLET, FILM COATED ORAL 3 TIMES DAILY
COMMUNITY
Start: 2024-05-16

## 2024-05-20 RX ORDER — OXYCODONE HYDROCHLORIDE 5 MG/1
TABLET ORAL
COMMUNITY
Start: 2024-04-17

## 2024-05-20 RX ORDER — OXYCODONE HYDROCHLORIDE 5 MG/1
5 CAPSULE ORAL
COMMUNITY

## 2024-05-20 RX ORDER — OXYCODONE HYDROCHLORIDE 20 MG/1
20 TABLET ORAL
COMMUNITY

## 2024-05-20 RX ORDER — SACUBITRIL AND VALSARTAN 49; 51 MG/1; MG/1
1 TABLET, FILM COATED ORAL 2 TIMES DAILY
COMMUNITY
Start: 2024-05-09

## 2024-05-20 NOTE — PROGRESS NOTES
"Chief Complaint  Renal Artery Stenosis (New pt )    Subjective        William Shah Jr. presents to [facility] VASCULAR SURGERY  History of Present Illness  Patient here for evaluation for renal artery stenosis in the setting of hypertension this difficult to control  Objective   Vital Signs:  /79 (BP Location: Right arm, Patient Position: Sitting, Cuff Size: Adult)   Ht 139.7 cm (55\")   Wt 90.3 kg (199 lb)   BMI 46.25 kg/m²   Estimated body mass index is 46.25 kg/m² as calculated from the following:    Height as of this encounter: 139.7 cm (55\").    Weight as of this encounter: 90.3 kg (199 lb).     Class 3 Severe Obesity (BMI >=40). Obesity-related health conditions include the following: peripheral vascular disease. Obesity is newly identified. BMI is is above average; no BMI management plan is appropriate. We discussed Information on healthy weight added to patient's after visit summary.    William Shah Jr.  reports that he quit smoking about 26 years ago. His smoking use included cigarettes. He started smoking about 36 years ago. He has a 10 pack-year smoking history. He has never used smokeless tobacco. I     Physical Exam   Result Review :    The following data was reviewed by: Thuan Mascorro MD on 05/20/2024:  CBC          11/5/2023    01:18 11/6/2023    01:35 11/7/2023    05:40   CBC   WBC 4.40     4.50     3.77       RBC 4.28     4.15     4.20       Hemoglobin 11.8     11.6     11.7       Hematocrit 36.2     34.7     35.3       MCV 84.6     83.6     84.0       MCH 27.6     28.0     27.9       MCHC 32.6     33.4     33.1       RDW 15.1     15.1     15.0       Platelets 264     294     272          Details          This result is from an external source.              BMP          7/26/2023    17:16 7/30/2023    11:31 9/19/2023    15:56   BMP   Potassium 4.1     4.2     3.6          Details          This result is from an external source.           Creatinine 0.59    "   Data reviewed : Cardiology studies as commented on below.  Vascular Surgical History:  Left to right femorofemoral bypass  Below-knee amputation on the left and above-knee amputation on the right.  Prior vascular surgical care done [place] in the [facility]  Vascular Imaging History:     Duplex done at [facility].  Slightly elevated renal artery ratio on the right and peak systolic velocity.     Assessment and Plan     Vascular surgery following for:  Renal artery stenosis.  Peripheral arterial disease  Status post bilateral lower extremity amputations.  Hypercoagulability.    Diagnoses and all orders for this visit:    1. Atherosclerosis of renal artery (Primary)  -     Duplex Renal Artery - Bilateral Complete CAR; Future    2. Amputation stump complication    3. Hx of AKA (above knee amputation), right    4. Hx of BKA, left    Patient with elevated blood pressure.  This in the setting of severe peripheral arterial disease status post bilateral lower extremity amputations.  This was all done in outside facilities.  I reviewed his renal duplex.  He has mild elevation of his renal aortic ratio and his peak systolic velocity on the right side.  Patient is adamantly opposed to even consideration of any sort of intervention.  I think given his extensive history with requirements for reoperations this is completely normal.  He has agreed to see me back in 1 years time for repeat renal artery duplex.     Vascular medical management:Continue Lipitor 40 mg daily, Plavix 75 mg daily, and Xarelto 20 mg daily.  Follow Up     Return in about 1 year (around 5/20/2025) for Follow up with vascular ultrasound.  Patient was given instructions and counseling regarding his condition or for health maintenance advice. Please see specific information pulled into the AVS if appropriate.

## 2024-05-20 NOTE — PATIENT INSTRUCTIONS
Nutrition and Heart Health  In this video, you'll learn why nutrition is an important part of a healthy lifestyle, especially if you have heart disease.  To view the content, go to this web address:  [email or web address]    This video will  on: 2026. If you need access to this video following this date, please reach out to the healthcare provider who assigned it to you.  This information is not intended to replace advice given to you by your health care provider. Make sure you discuss any questions you have with your health care provider.  Elsevier Patient Education ©  [employer]

## 2024-12-17 DIAGNOSIS — I70.1 ATHEROSCLEROSIS OF RENAL ARTERY: Primary | ICD-10-CM
